# Patient Record
(demographics unavailable — no encounter records)

---

## 2017-06-02 NOTE — ER DOCUMENT REPORT
ED Extremity Problem, Lower





- General


Chief Complaint: Laceration


Stated Complaint: LEG LACERATION


Time Seen by Provider: 06/02/17 17:50


Mode of Arrival: Ambulatory


Information source: Patient


Notes: 


-year-old male presents to ED for right lower leg laceration.  He states he was 

cutting some plastic with a knife when the knife slipped and cut his lower leg.

  He states his tetanus shot was last year.


TRAVEL OUTSIDE OF THE [place] IN LAST 30 DAYS: No





- HPI


Patient complains to provider of: Injury, Pain


Location: Leg - Right lower leg


Occurred: This afternoon


Where: Home, Indoors


Onset/Duration: Sudden


Quality of pain: Sharp


Severity: Moderate


Pain Level: 4


Context: Laceration


Recent injury: Yes


Associated symptoms: Painful ambulation


Exacerbated by: Movement, Walking


Relieved by: Nothing





- Related Data


Allergies/Adverse Reactions: 


 





No Known Allergies Allergy (Verified 06/02/17 17:02)


 











Past Medical History





- General


Information source: Patient





- Social History


Smoking Status: Current Every Day Smoker


Cigarette use (# per day): Yes - 16 cigarettes a day


Chew tobacco use (# tins/day): No


Smoking Education Provided: Yes - less than 2 min


Frequency of alcohol use: Social


Drug Abuse: None


Occupation: manager of garden  at [employer]


Lives with: Family


Family History: Arthritis, CAD, COPD, CVA, Hyperlipidemia, Hypertension, 

Malignancy


Patient has suicidal ideation: No


Patient has homicidal ideation: No





- Past Medical History


Cardiac Medical History: Reports: Hx DVT, Hx Hypertension


Pulmonary Medical History: Reports: None


EENT Medical History: Reports: None


Neurological Medical History: Reports: None


Endocrine Medical History: Reports: None


Renal/ Medical History: Reports: None


Malignancy Medical History: Reports None


GI Medical History: Reports: Hx Gastroesophageal Reflux Disease, Hx Colonoscopy

, Hx Endoscopy


Musculoskeltal Medical History: Reports Hx Arthritis, Reports Hx 

Musculoskeletal Trauma - Fracture hand bilateral toes  foot nose


Skin Medical History: Reports None


Psychiatric Medical History: Reports: None


Traumatic Medical History: Reports: Hx Fractures - Fracture hand bilateral toes

  foot nose


Infectious Medical History: Reports: None


Past Surgical History: Reports: Hx Appendectomy, Hx Cardiac Catheterization, Hx 

Orthopedic Surgery - R foot/knee, L hand, Hx Tonsillectomy, Other - eye surger





- Immunizations


Immunizations up to date: No - tetnus given in ED


Hx Diphtheria, Pertussis, Tetanus Vaccination: Yes





Review of Systems





- Review of Systems


Constitutional: No symptoms reported


EENT: No symptoms reported


Cardiovascular: No symptoms reported


Respiratory: No symptoms reported


Gastrointestinal: No symptoms reported


Genitourinary: No symptoms reported


Male Genitourinary: No symptoms reported


Musculoskeletal: No symptoms reported


Skin: Other - Right lower leg laceration


Hematologic/Lymphatic: No symptoms reported


Neurological/Psychological: No symptoms reported





Physical Exam





- Vital signs


Vitals: 


 











Temp Pulse Resp BP Pulse Ox


 


 98.1 F   69   20   142/91 H  95 


 


 06/02/17 17:02  06/02/17 17:02  06/02/17 17:02  06/02/17 17:02  06/02/17 17:02











Interpretation: Normal





- General


General appearance: Appears well, Alert





- HEENT


Head: Normocephalic, Atraumatic


Eyes: Normal


Pupils: PERRL





- Respiratory


Respiratory status: No respiratory distress


Chest status: Nontender


Breath sounds: Normal


Chest palpation: Normal





- Cardiovascular


Rhythm: Regular


Heart sounds: Normal auscultation


Murmur: No





- Abdominal


Inspection: Normal


Distension: No distension


Bowel sounds: Normal


Tenderness: Nontender


Organomegaly: No organomegaly





- Back


Back: Normal, Nontender





- Extremities


General upper extremity: Normal inspection, Nontender, Normal color, Normal ROM

, Normal temperature


General lower extremity: Nontender, Normal color, Normal ROM, Normal temperature

, Normal weight bearing.  No: Shawn's sign


Calf: Laceration





- Neurological


Neuro grossly intact: Yes


Cognition: Normal


Orientation: AAOx4


Emiliano Coma Scale Eye Opening: Spontaneous


Emiliano Coma Scale Verbal: Oriented


Emiliano Coma Scale Motor: Obeys Commands


Emiliano Coma Scale Total: 15


Speech: Normal


Motor strength normal: LUE, RUE, LLE, RLE


Sensory: Normal





- Psychological


Associated symptoms: Normal affect, Normal mood





- Skin


Skin Temperature: Warm


Skin Moisture: Dry


Skin Color: Normal


Skin irregularity: Laceration - Lower leg right





Course





- Vital Signs


Vital signs: 


 











Temp Pulse Resp BP Pulse Ox


 


 98.1 F   50 L  18   121/68   95 


 


 06/02/17 17:02  06/02/17 19:27  06/02/17 19:27  06/02/17 19:27  06/02/17 19:27














Discharge





- Discharge


Clinical Impression: 


Laceration of left leg


Qualifiers:


 Encounter type: initial encounter Qualified Code(s): S81.812A - Laceration 

without foreign body, left lower leg, initial encounter





Condition: Stable


Disposition: HOME, SELF-CARE


Additional Instructions: 


LACERATION CARE:





     Your laceration has been sutured to keep the skin edges aligned during 

healing.  The time of suture removal depends on the nature and location of your 

cut.  Please follow the care instructions the doctor has outlined for you and 

return for further care, according to the schedule you've been given.


     Keep the wound and dressing clean.  Unless you were told otherwise, you 

may shower daily, blotting the wound dry with a clean, unused towel.  At other 

times, If the dressing gets wet or blood soaked, remove it and blot the wound 

dry, then reapply a new dressing.  Unless you were instructed otherwise, 

dressings should be changed at least daily.


     If any signs of infection occur (swelling, redness, drainage, increasing 

tenderness, red streaks, tender lumps in the armpit or groin above the 

laceration, or fever), see the doctor immediately.








SOAP CLEANSING:


     Gently wash the wound daily using a mild soap (like Ivory, Phisoderm, 

Neutrogena).  Use warm water, rubbing gently until all debris, ooze, and 

crusting have been washed from the wound.  Allow to dry briefly (about 10 

minutes) after cleaning.  Repeat this cleansing at least three times a day for 

the first two days and then once or twice a day.








ANTIBIOTIC OINTMENT PROTECTION:


     Your wounds are such that dressing them is not practical or optional.  

After cleansing, you should apply a thin coating of antibiotic ointment (

Bacitracin, not Neosporin) to the wounds at least three times daily.  This 

lessens infection risk, and may decrease the amount of scarring.  Use a q-tip 

or dull butter knife, not your finger, to apply this ointment.


     Any debris or ooze which builds up in the ointment should be gently rubbed 

off with a sterile gauze pad.  Harder crusting may need to be gently scrubbed 

off with a clean wash cloth with soap and warm water, perhaps applying a warm, 

wet wash cloth to the wound for ten minutes first.


     Development of redness, severe itching, or blistering may mean allergy to 

the ointment.  See the doctor.








PROPHYLACTIC ANTIBIOTIC:


     The antibiotics which have been prescribed are designed to decrease the 

risk of infection.  Only certain types of wounds benefit from this -- the 

typical cut, scrape, or burn DOES NOT require antibiotics.  Of course, 

infection can still occur despite the use of prophylactic antibiotics.


     Your wound will heal with less chance of an infectious complication if you 

take the medication as directed.  The most important dose is the FIRST dose, so 

don't delay filling the prescription!








ORAL NARCOTIC MEDICATION:


     You have been given a prescription for pain control.  This medication is a 

narcotic.  It's best taken with food, as nausea can result if taken on an empty 

stomach.


     Don't operate machinery or drive within six hours of taking this 

medication.  Do not combine this medicine with alcohol, or with any medication 

which can cause sedation (such as cold tablets or sleeping pills) unless you 

get permission from the physician.


     Narcotics tend to cause constipation.  If possible, drink plenty of fluids 

and eat a diet high in fiber and fruits.





Cephalosporins





     An antibiotic of the cephalosporin class has been prescribed. This type of 

antibiotic covers a wide variety of infections, including those of the skin, 

lungs, middle ear, and urinary tract.


     This antibiotic is somewhat similar to the penicillin family. In rare cases

, a person who is allergic to penicillin will also be allergic to this 

medication.  If you have had a severe allergic reaction to penicillin, and have 

not taken this antibiotic since that time, notify your doctor.


     Antibiotics which cover many germs ("broad spectrum" antibiotics) are more 

likely to cause diarrhea or "yeast" infections.  Women prone to vaginal yeast 

problems may suffer an attack after taking this antibiotic.  In infants, oral 

thrush (white spots "stuck" on the cheek) or yeast diaper rash may result.  See 

your doctor if these problems occur.


     Call the doctor at once if you develop hives, itching, shortness of breath

, or lightheadedness.





cef





FOLLOW-UP CARE:


     Please return in __3___ days for an infection check and dressing change.





    Your sutures should be removed in  __9___  days.





    To facilitate a timely removal of your sutures, you may return to the 

Emergency Department at [facility].  You do not need to call for 

an appointment, but the best time to come in for suture removal is early in the 

morning.





     If you have been referred to another physician for follow-up care, call 

that physicians office for an appointment as you were instructed.  If you 

experience a significant change in your laceration, or if you are concerned 

there may be an infection (swelling, redness, drainage, increasing tenderness, 

red streaks, tender lumps in the armpit or groin above the laceration, or fever)

, return to the Emergency Department immediately re-evaluation.








Prescriptions: 


Hydrocodone/Acetaminophen [Norco 5-325 mg Tablet] 1 tab PO Q6HP PRN #7 tablet


 PRN Reason: 


Cefdinir 300 mg PO Q12 7 Days


Forms:  Elevated Blood Pressure, Smoking Cessation Education, Return to Work


Referrals: 


FELIPE PLUNKETT DO [Primary Care Provider] - Follow up as needed

## 2017-08-09 NOTE — EKG REPORT
SEVERITY:- BORDERLINE ECG -

SINUS RHYTHM

BORDERLINE T WAVE ABNORMALITIES

:

Confirmed by: Kenna Nye 09-Aug-2017 10:58:21

## 2017-08-09 NOTE — PDOC H&P
History of Present Illness


Admission Date/PCP: 


  17 07:26





  FELIPE PLUNKETT DO





Patient complains of: Chest pain


History of Present Illness: 


PABLO ESPITIA JR is a 52 year old  male with underlying hypertension

, along with coronary artery disease, having suffered a previous MI  with 

negative heart cath at that time, according to patient.  No cardiac workup 

since then.





Patient was lying in bed around midnight on the night when he developed sudden 

sharp and quite severe primarily substernal chest pain, that radiated across 

his entire chest.  Did not go into his neck jaw or back.  Associated shortness 

of breath and mild diaphoresis, along with vomiting 1.  No abdominal pain.





Drove himself to the emergency room.  Has been chest pain-free since Nitropaste 

applied.





History remarkable for DVT of the left lower extremity last year, treated with 

Lovenox.  He also drove back and forth to [place] last month, but did state that 

he stopped multiple times along the way.





Currently resting quietly, chest pain-free.





Patient has been discussed with emergency room physician who evaluated the 

patient. .


 


 


 


Dictation via voice recognition software.











Laboratory results are listed in [employer] and are reviewed. 


 


 


X-ray summary results are listed below, with full report(s) reviewed. .


 


 


 


EKG reviewed and compared to prior tracing from 2013.


 


 


 


Social history/personal habits: .  4 children.   at 

[employer].  Just under a pack of cigarettes per day.  Occasional alcohol, but not 

very much or very often.  Denies illicit drug use.


 


 


 


No known drug allergies.


 


 


Home medications initially autopopulated into [email or web address] may not accurately 

reflect patient's true medications, dosages, and/or frequencies. Pharmacy tech 

to reconcile  medications.


 


Unfortunately, patient not certain of all medications/dosages/frequencies.  


 





REVIEW OF SYSTEMS: 


 


Constitutional: No fever or chills.


 


Eyes: Wears reading glasses.


 


ENT: No swallowing problems or complaints.  Partial hearing loss.


 


Pulmonary: See history and present illness.


 


Cardiovascular: See history and present illness.


 


Gastrointestinal: See history and present illness.  Mild occasional reflux.


 


Skin: No current complaints, including rashes.


 


Hematologic: Easy bruising.


 


Neurologic: No current complaints, including numbness or tingling.


 


Musculoskeletal: Joint pain from arthritis.


 


Psychiatric: Denies anxiety or depression. 


 


Endocrine: No current complaints, including polyuria.


 


Genitourinary: No current complaints, including dysuria.


 


 


PHYSICAL EXAMINATION:


 


6 feet tall.  108.4 kg.  BMI 32.4 kg/m.  Blood pressure 123/68.  Pulse 54 and 

regular.  95% saturation on room air.  Respirations are 16 and unlabored. 

Temperature 98.





Slightly obese otherwise well-nourished well-developed  male appearing 

approximately his stated age.  Pleasant awake alert and cooperative.  No 

obvious distress other than perhaps mildly anxious.


 


Skin is warm and dry.  No grossly obvious evidence of rash in areas of skin 

examined.  No subcutaneous nodules palpated.


 


ENT: Perhaps mildly hard of hearing to normal conversation.  Tongue midline on 

protrusion pink and slightly tacky.


 


Eyes: No scleral icterus.  Pupils equal and reactive to light at 4 mm.  Pink 

conjunctivae.


 


Neck is supple and nontender to gentle active range of motion and palpation.  

Midline trachea.  No palpable thyroid nodule mass enlargement or tenderness.


 


Lymphatic: No palpable cervical or clavicular nodes.


 


Neck and lymphatic exams limited by patient body habitus.


 


Psychiatric: Reasonable insight into acute and chronic medical issues.  

Oriented to time location and why here.


 


Lungs: Auscultation reveals clear and equal breath sounds bilaterally.  No use 

of accessory respiratory muscles.


 


Cardiovascular: Heart regular rate and rhythm, without gallop murmur or rub.  

No carotid or abdominal aortic bruits. No ankle or pedal edema.  Palpable 

dorsalis pedis pulses.


 


Abdomen:soft somewhat obese nontender with positive bowel sounds.  Unable to 

adequately evaluate abdomen for masses or organomegaly due to body habitus.





Compression of neither his upper abdomen nor sternum reproduces his previously 

noted chest discomfort.


 


Extremities: Feet are warm and dry.  No calf tenderness to compression.  No 

grossly obvious visual evidence of calf swelling.  Gentle manipulation of lower 

extremities fails to reveal any obvious evidence of injury or instability to 

knees hips or ankles.


 


Neurologic: Moves upper extremities grossly normally.  Patellar reflexes 

absent.  Absent Babinski.  Light touch is intact at feet.  Dorsiflexion and 

plantarflexion of feet 5 / 5 and symmetric.


 











Past Medical History


Cardiac Medical History: Reports: Coronary Artery Disease, DVT - 2016, 

Myocardial Infarction, Hypertension


   Denies: Atrial Fibrillation, Congestive Heart Failure, Hyperlipidema, 

Pulmonary Embolism


Pulmonary Medical History: 


   Denies: Asthma, Chronic Obstructive Pulmonary Disease (COPD), Sleep Apnea


EENT Medical History: Reports: Eyes - Glasses, Ears - Partial hearing loss


   Denies: Throat


Neurological Medical History: 


   Denies: Hemorrhagic CVA, Ischemic CVA, Seizures


Endocrine Medical History: 


   Denies: Diabetes Mellitus Type 1, Diabetes Mellitus Type 2, Hyperthyroidism, 

Hypothyroidism


Renal/ Medical History: Reports: None


GI Medical History: Reports: Gastroesophageal Reflux Disease


   Denies: Cirrhosis, Hepatitis, Peptic Ulcer Disease


Musculoskeltal Medical History: Reports: Arthritis


Skin Medical History: Reports: None


Psychiatric Medical History: Reports: Tobacco Dependency


   Denies: Alcohol Dependency, Depression, General Anxiety Disorder, Substance 

Abuse


Hematology: Reports: Other - Easy bruising


   Denies: Anemia


Infectious Medical History: 


   Denies: Hepatitis B, Hepatitis C





Past Surgical History


Past Surgical History: Reports: Appendectomy, Cardiac Catheterization, 

Orthopedic Surgery - R foot/knee, L hand, Tonsillectomy, Other - Removal of 

foreign body from my


   Denies: Pacemaker





Social History


Information Source: Patient, Emergency Med Personnel, [facility] Records


Lives with: Spouse/Significant other


Smoking Status: Current Every Day Smoker


Frequency of Alcohol Use: Occasional


Drugs: None





- Advance Directive


Resuscitation Status: Full Code


Surrogate healthcare decision maker:: 


Wife





Family History


Family History: Arthritis, CAD, COPD, CVA, Hyperlipidemia, Hypertension, 

Malignancy


Parental Family History Reviewed: Yes - Mother  of cerebral aneurysm; 

father of suicide.


Children Family History Reviewed: Yes - Healthy


Sibling(s) Family History Reviewed.: Yes - Coronary artery disease





Medication/Allergy


Home Medications: 








Aspirin [Aspirin EC] 81 mg PO DAILY 17 


Omeprazole 40 mg PO DAILY 17 








Allergies/Adverse Reactions: 


 





No Known Allergies Allergy (Verified 17 17:02)


 











Physical Exam


Vital Signs: 


 











Temp Pulse Resp BP Pulse Ox


 


 98.4 F   46 L  16   107/64   93 


 


 17 07:26  17 07:26  17 07:26  17 07:26  17 07:26














Results


Impressions: 


 





Chest X-Ray  17 02:51


IMPRESSION:  Mild interstitial markings which may indicate mild pulmonary edema 

and/or chronic interstitial lung disease.


 














Assessment & Plan





- Diagnosis


(1) Chest pain


Qualifiers: 


     Chest pain type: unspecified     Qualified Code(s): R07.9 - Chest pain, 

unspecified  


Is this a current diagnosis for this admission?: YesPlan: 


Patient will be placed in observation bed under chest pain protocol.  Patient 

understands to notify staff should chest pain recur.  Serial troponin .  Repeat 

EKG.  lipid panel.





I have strongly encouraged patient to be careful getting out of bed without 

notifying staff, to avoid a fall with injury.





Knee high SCDs for DVT prophylaxis, along with subcu Lovenox.  





Impression and plans were discussed with patient, who concurs   .





Time spent in evaluation and management of patient: 68 minutes.








(2) CAD (coronary artery disease)


Qualifiers: 


     Coronary Disease-Associated Artery/Lesion type: native artery     Native 

vs. transplanted heart: native heart     Associated angina: with unspecified 

angina        Qualified Code(s): I25.119 - Atherosclerotic heart disease of 

native coronary artery with unspecified angina pectoris  


Is this a current diagnosis for this admission?: Yes





(3) History of DVT (deep vein thrombosis)


Is this a current diagnosis for this admission?: YesPlan: 


D-dimer.








(4) HTN (hypertension)


Qualifiers: 


     Hypertension type: essential hypertension        Qualified Code(s): I10 - 

Essential (primary) hypertension  


Is this a current diagnosis for this admission?: YesPlan: 


Resume home medications as appropriate once these have been determined and 

reviewed.








(5) Tobacco dependency


Is this a current diagnosis for this admission?: YesPlan: 


As needed nicotine patch.

## 2017-08-09 NOTE — RADIOLOGY REPORT (SQ)
EXAM DESCRIPTION:  CTA CHEST



COMPLETED DATE/TIME:  8/9/2017 5:43 pm



REASON FOR STUDY:  chest pain, eval for dissection, aneurysm



COMPARISON:  None.



TECHNIQUE:  CT scan of the chest performed using helical scanning technique with dynamic intravenous 
contrast injection.  Images reviewed with lung, soft tissue and bone windows.  Reconstructed coronal 
and sagittal MPR images reviewed.

Additional 3 dimensional post-processing performed to develop Maximal Intensity Projection images (MI
P).  All images stored on PACS.

All CT scanners at this facility use dose modulation, iterative reconstruction, and/or weight based d
osing when appropriate to reduce radiation dose to as low as reasonably achievable (ALARA).

[facility]: Dose Right  [facility]: CareDose    [facility]: Dose Right    [facility]: Teradose 4D    [facility]: Smart Technologies



CONTRAST TYPE AND DOSE:  contrast/concentration: Isovue 370.00 mg/ml; Total Contrast Delivered: 82.0 
ml; Total Saline Delivered: 70.0 ml



RENAL FUNCTION:  Creatinine 0.7 BUN 15



RADIATION DOSE:  Up-to-date CT equipment and radiation dose reduction techniques were employed. CTDIv
ol: 24.0 - 29.8 mGy. DLP: 968 mGy-cm. .



LIMITATIONS:  None.



FINDINGS:  LUNGS AND PLEURA: Small peripheral blebs are present in the lung apices.  Small blebs are 
seen posteriorly in the lower lobes.  There is no pulmonary infiltrate or pleural effusion.

AORTA AND GREAT VESSELS: No aneurysm or dissection.

HEART: No pericardial effusion.

PULMONARY ARTERIES: No emboli visualized in the main pulmonary arteries or the segmental branches.

HILAR AND MEDIASTINAL STRUCTURES: No identified masses or abnormal nodes.

HARDWARE: None in the chest.

UPPER ABDOMEN: No significant findings.  Limited exam.

THYROID AND OTHER SOFT TISSUES: No masses.  No adenopathy.

BONES: No acute or significant finding.

3D MIPS: Confirm above findings.

OTHER: No other significant finding.



IMPRESSION:  1.  Pulmonary emphysematous changes as described.

2.  There is no evidence of aortic aneurysm or dissection.  There is no evidence of pulmonary embolus
.



TECHNICAL DOCUMENTATION:  JOB ID:  [id number]

Quality ID # [id number]: Final reports with documentation of one or more dose reduction techniques (e.g., Au
tomated exposure control, adjustment of the mA and/or kV according to patient size, use of iterative 
reconstruction technique)

 2011 [employer]- All Rights Reserved

## 2017-08-09 NOTE — ER DOCUMENT REPORT
ED General





- General


Chief Complaint: Chest Pain


Stated Complaint: CHEST PAIN


Time Seen by Provider: 08/09/17 04:15


Notes: 


Patient is a 52-year-old male who presents with complaints of chest pain.  

Chest pain came on suddenly while he is lying in bed at home around midnight.  

Pain was sharp and severe.  It radiated across his entire chest.  It did not go 

into his neck or jaw or back.  He did feel short of breath.  He was a little 

diaphoretic.  He did vomit once.  No abdominal pain.  He does have a history of 

"a mild MI" in 2007.  He was shipped [facility].  At that time they 

did a heart cath and stress test which was negative.  No stress test since 

then.  Does have a history of high blood pressure.  He does have a history of 

smoking.


TRAVEL OUTSIDE OF THE [place] IN LAST 30 DAYS: No





- Related Data


Allergies/Adverse Reactions: 


 





No Known Allergies Allergy (Verified 06/02/17 17:02)


 











Past Medical History





- Social History


Smoking Status: Current Every Day Smoker


Frequency of alcohol use: None


Drug Abuse: None


Family History: Arthritis, CAD, COPD, CVA, Hyperlipidemia, Hypertension, 

Malignancy


Patient has suicidal ideation: No


Patient has homicidal ideation: No





- Past Medical History


Cardiac Medical History: Reports: Hx DVT, Hx Hypertension


Pulmonary Medical History: 


   Denies: Hx COPD


Renal/ Medical History: Denies: Hx Peritoneal Dialysis


GI Medical History: Reports: Hx Gastroesophageal Reflux Disease, Hx Colonoscopy

, Hx Endoscopy


Musculoskeltal Medical History: Reports Hx Arthritis, Reports Hx 

Musculoskeletal Trauma - Fracture hand bilateral toes  foot nose


Traumatic Medical History: Reports: Hx Fractures - Fracture hand bilateral toes

  foot nose


Past Surgical History: Reports: Hx Appendectomy, Hx Cardiac Catheterization, Hx 

Orthopedic Surgery - R foot/knee, L hand, Hx Tonsillectomy, Other - eye surger.

  Denies: Hx Pacemaker





- Immunizations


Immunizations up to date: No - tetnus given in ED


Hx Diphtheria, Pertussis, Tetanus Vaccination: Yes





Review of Systems





- Review of Systems


Notes: 


My Normal Review Basic





REVIEW OF SYSTEMS:


CONSTITUTIONAL :  Denies fever,  chills, or sweats.  Denies recent illness.


CARDIOVASCULAR: Had chest pain


RESPIRATORY:  Denies cough, cold, or chest congestion.  Denies shortness of 

breath, difficulty breathing, or wheezing.


GASTROINTESTINAL:  Denies abdominal pain. Vomiting x1.  Denies constipation.  

Last BM: 


MUSCULOSKELETAL:  Denies neck or back pain or joint pain or swelling.


SKIN:   Denies rash or skin lesions.


NEUROLOGICAL:  Denies altered mental status or loss of consciousness.  Denies 

headache.  Denies weakness or paralysis or loss of use of either side.  Denies 

problems with gait or speech.  Denies sensory or motor loss.


ALL OTHER SYSTEMS REVIEWED AND NEGATIVE.





Physical Exam





- Vital signs


Vitals: 


 











Temp Pulse Resp BP Pulse Ox


 


 98 F   61   18   144/87 H  96 


 


 08/09/17 01:42  08/09/17 01:42  08/09/17 01:42  08/09/17 01:42  08/09/17 01:42














- Notes


Notes: 


General Appearance: Well nourished, alert, cooperative, no acute distress, no 

obvious discomfort.


Vitals: reviewed, See vital signs table.


Eyes: PERRL, EOMI, Conjuctiva clear


Mouth: No decreasd moisture


Neck: Supple, no neck tenderness


Lungs: No wheezing, No rales, No rhonci, No accessory muscle use, good air 

exchange bilaterally.


Heart: Normal rate, Regular rythm, No murmur, no rub


Abdomen: Normal BS, soft, No rigidity, No abdominal tenderness, No guarding, no 

rebound, no abdominal masses, no organomegaly


Extremities: strength 5/5 in all extremities, good pulses in all extremities, 

no swelling or tenderness in the extremities, no edema.


Skin: warm, dry, appropriate color, no rash


Neuro: speech clear, oriented x 3, normal affect, responds appropriately to 

questions.





Course





- Re-evaluation


Re-evalutation: 


08/09/17 04:30


Patient is feeling improved.  His pain is improving.  I will give him a dose of 

aspirin.  Last time it aspirin was 5 AM yesterday.  Patient does have risk 

factors for heart disease.  He is a smoker.  Some family history.  Patient's 

heart score is 4.  He does have Nitropaste in place to help prevent return of 

pain.  I did talk to him about admission and he is agreeable to it.  We are 

currently pending to hear back from the hospitalist about possible admission.





08/09/17 06:14


I spoke with Dr. Mitchell who agrees to admit the patient.





- Vital Signs


Vital signs: 


 











Temp Pulse Resp BP Pulse Ox


 


 98 F   61   18   144/87 H  96 


 


 08/09/17 01:42  08/09/17 01:42  08/09/17 01:42  08/09/17 01:42  08/09/17 01:42














- Laboratory


Result Diagrams: 


 08/09/17 03:10





 08/09/17 03:10


Laboratory results interpreted by me: 


 











  08/09/17 08/09/17 08/09/17





  03:10 03:10 03:10


 


RDW  14.7 H  


 


Chloride   109 H 


 


Glucose   139 H 


 


Calcium   8.3 L 


 


Creatine Kinase   369 H 


 


CK-MB (CK-2)    4.79 H














Discharge





- Discharge


Clinical Impression: 


Chest pain


Qualifiers:


 Chest pain type: unspecified Qualified Code(s): R07.9 - Chest pain, unspecified





Condition: Stable


Disposition: ADMITTED AS OBSERVATION


Admitting Provider: Hospitalist


Unit Admitted: Telemetry

## 2017-08-09 NOTE — RADIOLOGY REPORT (SQ)
EXAM DESCRIPTION:  CHEST SINGLE VIEW



COMPLETED DATE/TIME:  8/9/2017 3:37 am



REASON FOR STUDY:  chest pain



COMPARISON:  None.



EXAM PARAMETERS:  NUMBER OF VIEWS: One view.

TECHNIQUE: Single frontal radiographic view of the chest acquired.

RADIATION DOSE: NA

LIMITATIONS: None.



FINDINGS:  LUNGS AND PLEURA: Mild interstitial markings.

MEDIASTINUM AND HILAR STRUCTURES: No masses.  Contour normal.

HEART AND VASCULAR STRUCTURES: Heart normal in size.  Normal vasculature.

BONES: No acute findings.

HARDWARE: None in the chest.

OTHER: No other significant finding.



IMPRESSION:  Mild interstitial markings which may indicate mild pulmonary edema and/or chronic inters
titial lung disease.



TECHNICAL DOCUMENTATION:  JOB ID:  [id number]

## 2017-08-09 NOTE — PDOC TRANSFER SUMMARY
General


Admission Date/PCP: 


  08/09/17 07:26





  FELIPE PLUNKETT DO





Transfer Date: 08/10/17


Accepting Facility: [facility]


Accepting Physician: dr patrick rodriguez


Resuscitation Status: Full Code





- Transfer Diagnosis


(1) NSTEMI (non-ST elevation myocardial infarction)


Is this a current diagnosis for this admission?: YesDiagnosis Summary: 





presented with chest pain and a good story and ruled in for acute myocardial 

ischemia with rising troponins and CK/MBs.  his ecg shows loss of R wave 

progression and flattening of anterolateral T waves since last one on file in 

2013.  case was discussed with dr roland without formal consultation and he 

agrees that pt needs interventional cardiology evaluation for left heart cath.  

I spoke with Dr Patrick Rodriguez at [facility] who agreed to accept 

him transfer pending an available bed.  He remains chest pain free at this 

time.  He is now receiving topical NTG, loading dose of plavix 300mg x1, 

lovenox 1 mg/kg, ASA and high dose lipitor.  beta blocker is contraindicated 

due to developing mild bradycardia with resting HR in mid-50s.  Case discussed 

with he and his wife and they are agreeable to transfer.








(2) CAD (coronary artery disease)


Is this a current diagnosis for this admission?: YesDiagnosis Summary: 


as above








(3) History of DVT (deep vein thrombosis)


Is this a current diagnosis for this admission?: YesDiagnosis Summary: 


treated appropriately last year, not on chronic anticoagulation; CTA neg for 

dissection, aneurysm and PE and ddimer only 28.








(4) HTN (hypertension)


Is this a current diagnosis for this admission?: YesDiagnosis Summary: 


well controlled at present.








(5) Tobacco dependency


Is this a current diagnosis for this admission?: YesDiagnosis Summary: 


counseled regarding tobacco cessation











- Transfer Medications


Home Medications: 


 





Aspirin [Aspirin EC] 81 mg PO DAILY 08/09/17 


Omeprazole 40 mg PO DAILY 08/09/17 








Transfer Medications: 


 Current Medications





Acetaminophen (Tylenol 325 Mg Tablet)  650 mg PO Q4HP PRN


   Stop: 09/08/17 07:28


Al Hydrox/Mg Hydrox/Simethicone (Maalox Plus Susp 30 Udcup)  30 ml PO Q4HP PRN


   PRN Reason: indigestion


   Stop: 09/08/17 07:25


Aspirin (Ecotrin 81 Mg Ec Tablet)  81 mg PO DAILY PEDRO


   Stop: 09/08/17 09:59


   Last Admin: 08/09/17 09:43 Dose:  81 mg


Atorvastatin Calcium (Lipitor 80 Mg Tablet)  80 mg PO QHS PEDRO


   Stop: 09/08/17 21:59


Docusate Sodium (Colace 100 Mg Capsule)  100 mg PO BID PEDRO


   Stop: 09/08/17 09:59


   Last Admin: 08/09/17 17:51 Dose:  Not Given


Enoxaparin Sodium (Lovenox Inj 120 Mg/0.8 Ml Disp.Syrin)  110 mg SUBCUT Q12 PEDRO


   Stop: 09/08/17 21:59


Sodium Chloride (Nacl 0.9% 1000 Ml Iv Soln)  1,000 mls @ 100 mls/hr IV 

CONTINUOUS PRN


   PRN Reason: THIS MED IS NOT "PRN"


   Stop: 09/08/17 13:57


   Last Admin: 08/09/17 15:19 Dose:  1,000 ml


Nicotine (Nicoderm 14 Mg/24 Hr Transdermal Patch)  1 each TD DAILYP PRN


   Stop: 09/08/17 07:35


   Last Admin: 08/09/17 09:44 Dose:  1 each


Nitroglycerin (Nitro-Dur 2.5 Mg (0.1 Mg/Hr) Transdermal Ptch)  1 each TD DAILY 

PEDRO


   Stop: 09/09/17 09:59


Promethazine HCl (Phenergan 25 Mg Tablet)  12.5 mg PO Q6HP PRN


   Stop: 09/08/17 07:28


Sodium Chloride (Saline Flush 2.5 Ml Monoject Prefil Syrin)  2.5 ml IV Q8 PEDRO


   Stop: 09/08/17 13:59


   Last Admin: 08/09/17 15:33 Dose:  Not Given











- Allergies


Allergies/Adverse Reactions: 


 





No Known Allergies Allergy (Verified 06/02/17 17:02)


 











- Diet/Activity


Discharge Diet: Regular


Discharge Activity: Supervised Activity





Hospital Course


Hospital Course: 


per dr rachel's admitting H&P - "PABLO ESPITIA JR is a 52 year old 

 male with underlying hypertension, along with coronary artery disease

, having suffered a previous MI 2007 with negative heart cath at that time, 

according to patient.  No cardiac workup since then.  Patient was lying in bed 

around midnight on the night when he developed sudden sharp and quite severe 

primarily substernal chest pain, that radiated across his entire chest.  Did 

not go into his neck jaw or back.  Associated shortness of breath and mild 

diaphoresis, along with vomiting 1.  No abdominal pain.  Drove himself to the 

emergency room.  Has been chest pain-free since Nitropaste applied.  History 

remarkable for DVT of the left lower extremity last year, treated with Lovenox.

  He also drove back and forth to [place] last month, but did state that he 

stopped multiple times along the way.  Currently resting quietly, chest pain-

free."





his initial evaluation was largely reassuring other than elevated CK/MB, his 

TpI was normal and ecg just had nonspecific findings.  his chest went away with 

topical NTG and did not return.  he was admitted and ruled in for acute 

myocardial ischemia with rising enzymes but no further changes to his ecg than 

noted above.  stat CTA ruled out aortic dissection and aneurysm and PE, ddimer 

was only 28 and wnl and remainder of his labs unremarkable, his lipid panel 

only mildly abnormal.





case discussed wt dr rodriguez, accepting cardiologist at [facility] and arrangements 

made for transfer to their facility when bed available; in the meantime he is 

treated medically.








Physical Exam


Vital Signs: 


 











Temp Pulse Resp BP Pulse Ox


 


 98.3 F   55 L  16   107/64   95 


 


 08/09/17 16:59  08/09/17 16:59  08/09/17 16:59  08/09/17 16:59  08/09/17 16:59








 Intake & Output











 08/08/17 08/09/17 08/10/17





 06:59 06:59 06:59


 


Intake Total   2052


 


Output Total   1700


 


Balance   352


 


Weight   108 kg











General appearance: PRESENT: no acute distress, obese, well-developed, well-

nourished


Head exam: PRESENT: atraumatic, normocephalic


Eye exam: PRESENT: EOMI.  ABSENT: conjunctival injection, scleral icterus


Neck exam: ABSENT: carotid bruit, JVD, lymphadenopathy, tenderness


Respiratory exam: PRESENT: clear to auscultation lamont.  ABSENT: accessory muscle 

use


Cardiovascular exam: PRESENT: RRR.  ABSENT: diastolic murmur, systolic murmur


Pulses: PRESENT: normal radial pulses, normal dorsalis pedis pul


Vascular exam: PRESENT: normal capillary refill


GI/Abdominal exam: PRESENT: normal bowel sounds, soft.  ABSENT: tenderness


Extremities exam: PRESENT: clubbing.  ABSENT: calf tenderness, pedal edema


Musculoskeletal exam: PRESENT: ambulatory, full ROM


Neurological exam: PRESENT: alert, awake, oriented to person, oriented to place

, oriented to time, oriented to situation


Psychiatric exam: PRESENT: appropriate affect, normal mood


Skin exam: PRESENT: dry, warm





Results


Laboratory Results: 


 











  08/09/17





  09:05


 


Triglycerides  167 H


 


Cholesterol  152.32


 


LDL Cholesterol Direct  86


 


VLDL Cholesterol  33.4 H


 


HDL Cholesterol  40








 











  08/09/17 08/09/17 08/09/17





  09:05 10:44 15:30


 


Creatine Kinase   


 


CK-MB (CK-2)    5.94 H


 


Troponin I  Cancelled  0.373  0.509


 


NT-Pro-B Natriuret Pep    46














  08/09/17





  15:30


 


Creatine Kinase  311 H


 


CK-MB (CK-2) 


 


Troponin I 


 


NT-Pro-B Natriuret Pep 











Impressions: 


 





Chest/Abdomen CTA  08/09/17 00:00


IMPRESSION:  1.  Pulmonary emphysematous changes as described.


2.  There is no evidence of aortic aneurysm or dissection.  There is no 

evidence of pulmonary embolus.


 








Chest X-Ray  08/09/17 02:51


IMPRESSION:  Mild interstitial markings which may indicate mild pulmonary edema 

and/or chronic interstitial lung disease.


 














Plan


Discharge Plan: 


transfer for invasive cardio eval


Time Spent: Greater than 30 Minutes

## 2017-08-09 NOTE — PROGRESS NOTE
Provider Note


Provider Note: 


admitted earlier this morning for substernal chest pain with history of prior 

MI but negative heart cath at [facility], his pain resolved with topical nitro 

and has not recurred.





his exam is benign, no murmur, lungs are clear, NSR without ischemic changes on 

ecg, symmetric pulses at the radial arteries, no edema or JVD, no carotid or 

abdominal bruits, clubbing of all nails with tobacco stains on his dominant 

hand.  cxr was clear without widening of the mediastinum.  labs unremarkable 

except CK, MB elevated on arrival and repeat troponin now 0.373.





A/P:





NSTEMI - will ck another troponin, echo and CTA chest.  start empiric full dose 

lovenox, high dose statin, topical nitro and continue ASA.  if continues to 

rise and remaining eval are negative then he will need transfer for heart cath





tob abuse - cessation counseling

## 2017-08-10 NOTE — PROGRESS NOTE
Provider Note


Provider Note: 


patient seen and examined; chart reviewed; nursing notes reviewed and discussed 

with nurse at bedside





he remains chest pain free and in good spirits.  he is hemodynamically stable 

as well.  awake and alert and oriented x3, lungs CTAB, cardio rrr without m/r/g

, radial pulses good and symmetric, no edema or JVD, abd s/nt/nd with good BSs 

and no abd bruit, strenght is 5/5 upper and lower ext's, mood/affect 

appropriate.





CTA chest reviewed and shows no worrisome findings.





at this point he is stable for transfer to [facility] for invasive cardio 

evaluation pending bed availability.





a/p:


NSTEMI - stable, continue current regimen


HTN - well controlled on current regimen


tob abuse - cessation counseling offered previously, wife assures me "he WILL 

quit"

## 2017-08-10 NOTE — EKG REPORT
SEVERITY:- ABNORMAL ECG -

SINUS RHYTHM

NONSPECIFIC T ABNORMALITIES, LATERAL LEADS

:

Confirmed by: Kenna Nye 10-Aug-2017 13:28:18

## 2018-03-20 NOTE — EKG REPORT
SEVERITY:- OTHERWISE NORMAL ECG -

SINUS BRADYCARDIA

:

Confirmed by: Kenna Nye 20-Mar-2018 15:52:35

## 2018-03-20 NOTE — PDOC H&P
History of Present Illness


Admission Date/PCP: 


  03/20/18 17:07





  FELIPE JAMES MD





Patient complains of: Chest pain and left foot pain


History of Present Illness: 


PABLO ESPITIA JR is a 52 year old male resented to hospital with complaint of 

chest pain.  Patient states the chest pain began today.  Patient states he was 

short of breath and nauseated.  Patient states that the real reason why he came 

to the emergency room was because his left foot was hurting.  Patient reports 

that he has been evaluated for coronary artery disease invited where he 

underwent a cardiac cath that demonstrated essentially normal vessels.  ER has 

also documented that they called by the cardiology and cardiology stated that 

patient's chest pain is not cardiac.








Past Medical History


Cardiac Medical History: Reports: Coronary Artery Disease, DVT - 2016, 

Myocardial Infarction, Hypertension


   Denies: Atrial Fibrillation, Congestive Heart Failure, Hyperlipidema, 

Pulmonary Embolism


Pulmonary Medical History: 


   Denies: Asthma, Chronic Obstructive Pulmonary Disease (COPD), Sleep Apnea


Neurological Medical History: 


   Denies: Seizures


Endocrine Medical History: 


   Denies: Diabetes Mellitus Type 1, Diabetes Mellitus Type 2, Hyperthyroidism, 

Hypothyroidism


GI Medical History: Reports: Gastroesophageal Reflux Disease


   Denies: Cirrhosis, Hepatitis


Musculoskeltal Medical History: Reports: Arthritis


Psychiatric Medical History: 


   Denies: Depression


Hematology: 


   Denies: Anemia





Past Surgical History


Past Surgical History: Reports: Appendectomy, Cardiac Catheterization, 

Orthopedic Surgery - R foot/knee, L hand, R arm, Tonsillectomy, Other - Removal 

of foreign body from my


   Denies: Pacemaker





Social History


Smoking Status: Current Every Day Smoker


Frequency of Alcohol Use: Occasional


Hx Recreational Drug Use: No


Drugs: None


Hx Prescription Drug Abuse: No





- Advance Directive


Resuscitation Status: Full Code





Family History


Family History: Arthritis, CAD, COPD, CVA, Hyperlipidemia, Hypertension, 

Malignancy


Parental Family History Reviewed: Yes


Children Family History Reviewed: Yes


Sibling(s) Family History Reviewed.: Yes





Medication/Allergy


Home Medications: 








Aspirin [Aspirin EC] 81 mg PO DAILY 08/09/17 


Omeprazole 40 mg PO DAILY 08/09/17 








Allergies/Adverse Reactions: 


 





No Known Allergies Allergy (Verified 03/20/18 14:33)


 











Review of Systems


Constitutional: ABSENT: chills, fever(s), headache(s), weight gain, weight loss


Eyes: ABSENT: visual disturbances


Ears: ABSENT: hearing changes


Cardiovascular: PRESENT: chest pain.  ABSENT: dyspnea on exertion, edema, 

orthropnea, palpitations


Respiratory: ABSENT: cough, hemoptysis


Gastrointestinal: ABSENT: abdominal pain, constipation, diarrhea, hematemesis, 

hematochezia, nausea, vomiting


Genitourinary: ABSENT: dysuria, hematuria


Musculoskeletal: PRESENT: joint swelling - Left foot tenderness


Integumentary: ABSENT: rash, wounds


Neurological: ABSENT: abnormal gait, abnormal speech, confusion, dizziness, 

focal weakness, syncope


Psychiatric: ABSENT: anxiety, depression, homidical ideation, suicidal ideation


Endocrine: ABSENT: cold intolerance, heat intolerance, polydipsia, polyuria


Hematologic/Lymphatic: ABSENT: easy bleeding, easy bruising





Physical Exam


Vital Signs: 


 











Temp Pulse Resp BP Pulse Ox


 


 98.6 F   58 L  16   133/81 H  97 


 


 03/20/18 14:37  03/20/18 14:37  03/20/18 14:37  03/20/18 14:37  03/20/18 16:06











General appearance: PRESENT: no acute distress, well-developed, well-nourished


Head exam: PRESENT: atraumatic, normocephalic


Eye exam: PRESENT: conjunctiva pink, EOMI.  ABSENT: scleral icterus


Ear exam: PRESENT: normal external ear exam


Mouth exam: PRESENT: moist, tongue midline


Neck exam: ABSENT: carotid bruit, JVD, lymphadenopathy, thyromegaly


Respiratory exam: PRESENT: clear to auscultation lamont.  ABSENT: rales, rhonchi, 

wheezes


Cardiovascular exam: PRESENT: RRR.  ABSENT: diastolic murmur, rubs, systolic 

murmur


Pulses: PRESENT: normal dorsalis pedis pul


Vascular exam: PRESENT: normal capillary refill


GI/Abdominal exam: PRESENT: normal bowel sounds, soft.  ABSENT: distended, 

guarding, mass, organolmegaly, rebound, tenderness


Rectal exam: PRESENT: deferred


Extremities exam: ABSENT: calf tenderness, clubbing, pedal edema


Neurological exam: PRESENT: alert, awake, oriented to person, oriented to place

, oriented to time, oriented to situation, CN II-XII grossly intact.  ABSENT: 

motor sensory deficit


Psychiatric exam: PRESENT: appropriate affect, normal mood.  ABSENT: homicidal 

ideation, suicidal ideation


Skin exam: PRESENT: dry, intact, warm.  ABSENT: cyanosis, rash





Results


Impressions: 


 





Venous Doppler Study  03/20/18 15:00


IMPRESSION:  NO EVIDENCE OF DVT OR SVT IN THE LEFT LEG.


 














Assessment & Plan





- Diagnosis


(1) Chest pain


Is this a current diagnosis for this admission?: Yes   


Plan: 


Noncardiac: Patient had a cardiac cath August 2017 invited that demonstrated no 

significant disease.  Will try to obtain records.  Will have cardiology 

evaluate patient.








(2) Tendinitis of ankle


Is this a current diagnosis for this admission?: Yes   


Plan: 


Supportive care will write for Lidoderm patch. X ray Left foot. 








(3) NSTEMI (non-ST elevation myocardial infarction)


Is this a current diagnosis for this admission?: Yes   


Plan: 


History of NSTEMI: Supportive care. 








(4) History of DVT (deep vein thrombosis)


Is this a current diagnosis for this admission?: Yes   


Plan: 


SCDs








(5) Tobacco dependency


Is this a current diagnosis for this admission?: Yes   


Plan: 


Nicotine








- Time


Time Spent: 30 to 50 Minutes

## 2018-03-20 NOTE — ER DOCUMENT REPORT
ED General





- General


Chief Complaint: Nausea/Vomiting


Stated Complaint: NAUSEA/ CHEST PAIN


Time Seen by Provider: 03/20/18 14:49


Mode of Arrival: Ambulatory


Information source: Patient


Notes: 





52-year-old male presents with complaints of chest pain, patient denies any 

fevers or chills admits to nausea vomiting diarrhea over the past 3 days noted 

to have epigastric burning sensation


TRAVEL OUTSIDE OF THE [place] IN LAST 30 DAYS: No





- HPI


Onset: Just prior to arrival


Onset/Duration: Sudden


Quality of pain: Pressure


Severity: Mild


Pain Level: 1


Associated symptoms: Chest pain


Exacerbated by: Denies


Relieved by: Denies


Similar symptoms previously: No


Recently seen / treated by doctor: No





- Related Data


Allergies/Adverse Reactions: 


 





No Known Allergies Allergy (Verified 03/20/18 14:33)


 











Past Medical History





- Social History


Smoking Status: Current Every Day Smoker


Cigarette use (# per day): Yes


Chew tobacco use (# tins/day): No


Smoking Education Provided: No


Frequency of alcohol use: Occasional


Drug Abuse: None


Family History: Arthritis, CAD, COPD, CVA, Hyperlipidemia, Hypertension, 

Malignancy


Patient has suicidal ideation: No


Patient has homicidal ideation: No





- Past Medical History


Cardiac Medical History: Reports: Hx Coronary Artery Disease, Hx DVT - 2016, Hx 

Heart Attack, Hx Hypertension


   Denies: Hx Atrial Fibrillation, Hx Congestive Heart Failure, Hx 

Hypercholesterolemia, Hx Pulmonary Embolism


Pulmonary Medical History: 


   Denies: Hx Asthma, Hx COPD, Hx Sleep Apnea


Neurological Medical History: Denies: Hx Seizures


Endocrine Medical History: Denies: Hx Diabetes Mellitus Type 1, Hx Diabetes 

Mellitus Type 2, Hx Hyperthyroidism, Hx Hypothyroidism


Renal/ Medical History: Denies: Hx Peritoneal Dialysis


GI Medical History: Reports: Hx Gastroesophageal Reflux Disease, Hx Colonoscopy

, Hx Endoscopy.  Denies: Hx Cirrhosis, Hx Hepatitis


Musculoskeltal Medical History: Reports Hx Arthritis, Reports Hx 

Musculoskeletal Trauma - Fracture hand bilateral toes  foot nose


Psychiatric Medical History: 


   Denies: Hx Depression


Traumatic Medical History: Reports: Hx Fractures - Fracture hand bilateral toes

  foot nose


Infectious Medical History: Denies: Hx Hepatitis


Past Surgical History: Reports: Hx Appendectomy, Hx Cardiac Catheterization, Hx 

Orthopedic Surgery - R foot/knee, L hand, R arm, Hx Tonsillectomy, Other - 

Removal of foreign body from my.  Denies: Hx Pacemaker





- Immunizations


Immunizations up to date: No - tetnus given in ED


Hx Diphtheria, Pertussis, Tetanus Vaccination: Yes





Review of Systems





- Review of Systems


Notes: 





REVIEW OF SYSTEMS:


CONSTITUTIONAL :  Denies fever,  chills, or sweats.  Denies recent illness.


EENT:   Denies eye, ear, throat, or mouth pain or symptoms.  Denies nasal or 

sinus congestion or discharge.  Denies throat, tongue, or mouth swelling or 

difficulty swallowing.


CARDIOVASCULAR: Admits to chest


RESPIRATORY:  Denies cough, cold, or chest congestion.  Denies shortness of 

breath, difficulty breathing, or wheezing.


GASTROINTESTINAL:  Denies abdominal pain or distention.  Denies nausea, vomiting

, or diarrhea.  Denies blood in vomitus, stools, or per rectum.  Denies black, 

tarry stools.  Denies constipation.  


GENITOURINARY:  Denies difficulty urinating, painful urination, burning, 

frequency, blood in urine, or discharge.


MUSCULOSKELETAL:  Denies back or neck pain or stiffness.  Denies joint pain or 

swelling.


SKIN:   Left foot swelling


HEMATOLOGIC :   Denies easy bruising or bleeding.


LYMPHATIC:  Denies swollen, enlarged glands.


NEUROLOGICAL:  Denies confusion or altered mental status.  Denies passing out 

or loss of consciousness.  Denies dizziness or lightheadedness.  Denies 

headache.  Denies weakness or paralysis or loss of use of either side.  Denies 

problems with gait or speech.  Denies sensory loss, numbness, or tingling.  

Denies seizures.


PSYCHIATRIC:  Denies anxiety or stress.  Denies depression, suicidal ideation, 

or homicidal ideation.





ALL OTHER SYSTEMS REVIEWED AND NEGATIVE.





Dictation was performed using Dragon voice recognition software 





PHYSICAL EXAMINATION:





GENERAL: Well-appearing, well-nourished and in no acute distress.





HEAD: Atraumatic, normocephalic.





EYES: Pupils equal round and reactive to light, extraocular movements intact, 

sclera anicteric, conjunctiva are normal.





ENT: Nares patent, oropharynx clear without exudates.  Moist mucous membranes.





NECK: Normal range of motion, supple without lymphadenopathy





LUNGS: Breath sounds clear to auscultation bilaterally and equal.  No wheezes 

rales or rhonchi.





HEART: Regular rate and rhythm without murmurs





ABDOMEN: Soft, nontender, nondistended abdomen.  No guarding, no rebound.  No 

masses appreciated.





Musculoskeletal: Normal range of motion, no pitting or edema.  No cyanosis.





NEUROLOGICAL: Cranial nerves grossly intact.  Normal speech, normal gait.  

Normal sensory, motor exams 





PSYCH: Normal mood, normal affect.





SKIN: Warm, Dry, normal turgor, no rashes or lesions noted.





Physical Exam





- Vital signs


Vitals: 


 











Temp Pulse Resp BP Pulse Ox


 


 98.6 F   58 L  16   133/81 H  96 


 


 03/20/18 14:37  03/20/18 14:37  03/20/18 14:37  03/20/18 14:37  03/20/18 14:37














Course





- Re-evaluation


Re-evalutation: 





03/20/18 16:29


[facility] paged no answer 


03/20/18 16:45


[facility] called , pt did have NSTEMI but there were no blockages and 

medical managment was all that was needed


03/20/18 16:47








- Vital Signs


Vital signs: 


 











Temp Pulse Resp BP Pulse Ox


 


 98.6 F   58 L  16   133/81 H  97 


 


 03/20/18 14:37  03/20/18 14:37  03/20/18 14:37  03/20/18 14:37  03/20/18 16:06














- Laboratory


Result Diagrams: 


 03/20/18 15:06





 03/20/18 15:06


Laboratory results interpreted by me: 


 











  03/20/18





  15:06


 


RDW  14.5 H














Discharge





- Discharge


Clinical Impression: 


Chest pain


Qualifiers:


 Chest pain type: unspecified Qualified Code(s): R07.9 - Chest pain, unspecified





CAD (coronary artery disease)


Qualifiers:


 Coronary Disease-Associated Artery/Lesion type: unspecified vessel or lesion 

type Native vs. transplanted heart: native heart Associated angina: without 

angina Qualified Code(s): I25.10 - Atherosclerotic heart disease of native 

coronary artery without angina pectoris





Condition: Stable


Disposition: ADMITTED AS OBSERVATION


Admitting Provider: Hospitalist


Unit Admitted: Telemetry

## 2018-03-20 NOTE — RADIOLOGY REPORT (SQ)
EXAM DESCRIPTION:  FOOT LEFT 2 VIEWS



COMPLETED DATE/TIME:  3/20/2018 6:26 pm



REASON FOR STUDY:  left foot pain



COMPARISON:  None.



NUMBER OF VIEWS:  Two views



TECHNIQUE:  AP and lateral radiographic images acquired of the left foot.



LIMITATIONS:  None.



FINDINGS:  MINERALIZATION: Normal.

BONES: No acute fracture or dislocation.  No worrisome bone lesions.  Separate bony ossicles are iden
tified at the level of the navicular and talar tarsal bones.

JOINTS: No effusions.

SOFT TISSUES: No soft tissue swelling.  No foreign body.

OTHER: Minimal Achilles tendon and plantar spurring is identified.



IMPRESSION:  NEGATIVE STUDY OF THE LEFT FOOT. NO RADIOGRAPHIC EVIDENCE OF ACUTE INJURY.



TECHNICAL DOCUMENTATION:  JOB ID:  [id number]

 2011 [employer]- All Rights Reserved



Reading location - IP/workstation name: [email or web address]

## 2018-03-20 NOTE — RADIOLOGY REPORT (SQ)
EXAM DESCRIPTION:  VENOUS UNILATERAL LOWER



COMPLETED DATE/TIME:  3/20/2018 4:03 pm



REASON FOR STUDY:  left lower swelling



COMPARISON:  9/1/2016



TECHNIQUE:  Dynamic and static gray scale and color images acquired of the left leg venous system. Se
lected spectral images acquired with additional compression and augmentation maneuvers. The contralat
eral common femoral vein and saphenofemoral junction were also imaged. Images stored on PACS.



LIMITATIONS:  None.



FINDINGS:  COMMON FEMORAL: Normal phasicity, compression and augmentation. No visualized echogenic ma
terial on gray scale. No defects on color images.

FEMORAL: Normal compression and augmentation. No visualized echogenic material on gray scale. No defe
cts on color images.

POPLITEAL: Normal compression, augmentation. No visualized echogenic material on gray scale. No defec
ts on color images.

CALF VESSELS: Normal compression, augmentation. No visualized echogenic material on gray scale. No de
fects on color images.

GSV and SSV: Normal compression, augmentation. No visualized echogenic material on gray scale. No def
ects on color images.

ANY DEEP VENOUS INSUFFICIENCY: Not evaluated.

ANY EVIDENCE OF POPLITEAL CYST: No.

OTHER: No other significant finding.

CONTRALATERAL COMMON FEMORAL VEIN AND SAPHENOFEMORAL JUNCTION:

Normal phasicity, compression and augmentation. No visualized echogenic material on gray scale. No de
fects on color images.



IMPRESSION:  NO EVIDENCE OF DVT OR SVT IN THE LEFT LEG.



COMMENT:  Findings were discussed with the ordering physician at 1614 hours on this date.



TECHNICAL DOCUMENTATION:  JOB ID:  [id number]

 2011 [employer]- All Rights Reserved



Reading location - IP/workstation name: [email or web address]

## 2018-03-21 NOTE — PDOC CONSULTATION
Consultation


Consult Date: 03/20/18


Attending physician:: JOSE ROWLEY





History of Present Illness


Admission Date/PCP: 


  03/20/18 17:07





  FELIPE JAMES MD





Patient complains of: Chest pain


History of Present Illness: 


PABLO ESPITIA JR is a 52 year old male resented to hospital with complaint of 

chest pain.  Patient states the chest pain began today.  Patient states he was 

short of breath and nauseated.  Patient states that the real reason why he came 

to the emergency room was because his left foot was hurting.  Patient reports 

that he has been evaluated for coronary artery disease invited where he 

underwent a cardiac cath that demonstrated essentially normal vessels.  ER has 

also documented that they called by the cardiology and cardiology stated that 

patient's chest pain is not cardiac.


Patient on my questioning described the chest pain is located in the left side 

lower chest area.  Best description he can give is more of aching.  Patient 

does describe prior history of myocardial infarction.  He denied any exertional 

component to the chest discomfort.  Patient was admitted for further evaluation 

and management.  I was consulted to evaluate his chest pain.








Past Medical History


Cardiac Medical History: Reports: Coronary Artery Disease, DVT - 2016, 

Myocardial Infarction, Hypertension


   Denies: Atrial Fibrillation, Congestive Heart Failure, Hyperlipidema, 

Pulmonary Embolism


Pulmonary Medical History: 


   Denies: Asthma, Chronic Obstructive Pulmonary Disease (COPD), Sleep Apnea


Neurological Medical History: 


   Denies: Seizures


Endocrine Medical History: 


   Denies: Diabetes Mellitus Type 1, Diabetes Mellitus Type 2, Hyperthyroidism, 

Hypothyroidism


GI Medical History: Reports: Gastroesophageal Reflux Disease


   Denies: Cirrhosis, Hepatitis


Musculoskeltal Medical History: Reports: Arthritis


Psychiatric Medical History: 


   Denies: Depression


Hematology: 


   Denies: Anemia





Past Surgical History


Past Surgical History: Reports: Appendectomy, Cardiac Catheterization, 

Orthopedic Surgery - R foot/knee, L hand, R arm, Tonsillectomy, Other - Removal 

of foreign body from my


   Denies: Pacemaker





Social History


Information Source: Patient


Smoking Status: Current Every Day Smoker


Frequency of Alcohol Use: Occasional


Hx Recreational Drug Use: No


Drugs: None


Hx Prescription Drug Abuse: No





- Advance Directive


Resuscitation Status: Full Code





Family History


Family History: Arthritis, CAD, COPD, CVA, Hyperlipidemia, Hypertension, 

Malignancy


Parental Family History Reviewed: Yes


Children Family History Reviewed: Yes


Sibling(s) Family History Reviewed.: Yes





Medication/Allergy


Home Medications: 








Aspirin [Aspirin EC] 81 mg PO DAILY 03/20/18 


Esomeprazole Magnesium [Nexium] 40 mg PO DAILY 03/20/18 


Atorvastatin Calcium [Lipitor 40 mg Tablet] 40 mg PO QHS #30 tablet 03/21/18 


Nicotine [Nicoderm 21 mg/24 Hr Transderm Patch] 1 each TD DAILY #30 patch.td24 

03/21/18 








Allergies/Adverse Reactions: 


 





No Known Allergies Allergy (Verified 03/20/18 14:33)


 











Review of Systems


Review of Systems: 





Please see history of present illness and past medical history as wall.


Constitutional: No fever or chills reported.


Head : No recent chronic headaches, recent head injury.


Eyes: No recent eye pain, diplopia, redness, discharge, acute visual changes.


Ears: No recent chronic ear pain, acute hearing loss, ear discharge.


Oral cavity: No recent  ulcerations, bleeding, oral cavity discomfort.


Neck: No recent acute neck pain reported.


Hematologic: No recent easy bruising or bleeding or hematologic malignancy 

reported.


Lymphatic: No recent lymphatic malignancy, chronic lymphadenopathy reported yet


Cardiovascular system review: See history of present illness.


Respiratory system review: No recent chronic cough, hemoptysis, blood clots in 

the lungs reported. Mild Shortness of breath on exertion


Gastrointestinal system review: Negative for any recent acute or chronic 

abdominal pain, hematemesis, melena, recent change in bowel habits.  


Genitourinary system review: No recent acute or chronic hematuria, flank pain, 

UTI etc. reported.


Skin system review: Negative for any recent abnormal bruising, no rash, no 

pruritus reported.


Neurologic: No prior history of strokes, mini strokes, seizure disorder.


Psychologic: No history of major psychosis or major depression reported.


Musculoskeletal: Minor aches and pains reported.  No acute joint swelling 

reported.


Endocrine: No recent polyuria, polydipsia, recent heat or cold intolerance.





Physical Exam


Vital Signs: 


 











Temp Pulse Resp BP Pulse Ox


 


 98.6 F   58 L  12   120/74   95 


 


 03/20/18 14:37  03/20/18 14:37  03/20/18 18:01  03/20/18 18:01  03/20/18 18:01














Results


Laboratory Results: 


 











  03/20/18





  18:20


 


Troponin I  0.030











EKG Comments: 





Shows sinus rhythm, no acute ST-T wave changes are noted.


Impressions: 


 





Foot X-Ray  03/20/18 00:00


IMPRESSION:  NEGATIVE STUDY OF THE LEFT FOOT. NO RADIOGRAPHIC EVIDENCE OF ACUTE 

INJURY.


 








Venous Doppler Study  03/20/18 15:00


IMPRESSION:  NO EVIDENCE OF DVT OR SVT IN THE LEFT LEG.


 














Assessment & Plan





- Diagnosis


(1) Chest pain


Qualifiers: 


   Chest pain type: unspecified   Qualified Code(s): R07.9 - Chest pain, 

unspecified   


Is this a current diagnosis for this admission?: Yes   





(2) CAD (coronary artery disease)


Qualifiers: 


   Coronary Disease-Associated Artery/Lesion type: unspecified vessel or lesion 

type   Native vs. transplanted heart: native heart   Associated angina: without 

angina   Qualified Code(s): I25.10 - Atherosclerotic heart disease of native 

coronary artery without angina pectoris   





(3) HTN (hypertension)


Qualifiers: 


   Hypertension type: essential hypertension   Qualified Code(s): I10 - 

Essential (primary) hypertension   


Is this a current diagnosis for this admission?: Yes   





(4) History of DVT (deep vein thrombosis)


Is this a current diagnosis for this admission?: Yes   





(5) Tobacco dependency


Is this a current diagnosis for this admission?: Yes   





- Notes


Notes: 





Chest pain: Patient describes prior history of myocardial infarction.  He also 

describes having had a heart catheterization about a year ago.  Feel that it is 

worthwhile to evaluate this further with a 2D echocardiogram and a nuclear 

stress test.  Will also recommend a CTA of the chest to look for any noncardiac 

chest pain.  Need to rule out pulmonary embolism in view of history of prior 

DVT.


Coronary artery disease: Patient describes history of prior myocardial 

infarction.  Recommend treatment with beta blockers statin ACE inhibitor/ARB.


Hypertension: Reasonably well controlled. Blood pressure goal in this patient 

is 135/85 or less.  This was discussed with the patient.  Currently blood 

pressure under reasonable control.  Better medication for this patient are ACE 

inhibitor/ARB/beta blocker etc. discussed side effects of uncontrolled 

hypertension and also severe hypotension.


Patient has history of chronic smoking.  Patient advised to quit smoking.


History of deep venous thrombosis: Venous duplex study was evaluated.  Patient 

to report any recurrence of leg discomfort etc.





- Time


Time Spent: 30 to 50 Minutes - CODE STATUS was discussed, patient remains full 

code.  Surrogate decision-maker unchanged.  Multiple medical problems were 

addressed.  More than 50% of the time spent coordinating care, discussing 

management plans with involved caregivers.  Management plans discussed with 

involved personnels.  Medical decision making was of moderate to high complexity

, patient's has multiple  comorbidities.


Medications reviewed and adjusted accordingly: Yes

## 2018-03-21 NOTE — XCELERA REPORT
[address] [address]

                             Tel: [phone]

                             Fax: [phone]



                    Transthoracic Echocardiogram Report

____________________________________________________________________________



Name: PABLO ESPITIA JR

MRN: [id number]                Age: 52 yrs

Gender: Male                   : 1965

Patient Status: Inpatient      Patient Location: [address] [place] [address]

Account #: [id number]

Study Date: 2018 11:14 AM

Accession #: [id number]

____________________________________________________________________________



Height: 71 in        Weight: 250 lb        BSA: 2.3 m2



____________________________________________________________________________

Procedure: A complete two-dimensional transthoracic echocardiogram was

performed (2D, M-mode, spectral and color flow Doppler). The study was

technically difficult with many images being suboptimal in quality.

Reason For Study: CP





Ordering Physician: KENNA HUI

Performed By: Sandi Hunt

____________________________________________________________________________





Interpretation Summary

The left ventricular ejection fraction is normal.

There is borderline concentric left ventricular hypertrophy.

Doppler measurements suggest impaired left ventricular relaxation, which is

associated with grade I/IV or mild diastolic dysfunction

The left ventricle is grossly normal size.

Wall motion cannot be accurately commented on, but no definite regional

wall motion abnormalities noted.

The right ventricular systolic function is normal.

The left atrium is mildly dilated.

The right atrium is normal in size

There is a trace amount of mitral regurgitation

There is no mitral valve stenosis.

No aortic regurgitation is present.

There is no aortic valve stenosis

No tricuspid regurgitation.

There is no tricuspid stenosis.

The aortic root is not well visualized but is probably normal size.

The inferior vena cava was not well visualized

There is no pericardial effusion.



____________________________________________________________________________



MMode/2D Measurements & Calculations

RVDd: 3.7 cm      LVIDd: 5.2 cm  FS: 36.3 %          Ao root diam: 4.1 cm

IVSd: 0.90 cm     LVIDs: 3.3 cm  EDV(Teich): 129.2 ml

                  LVPWd: 0.89 cm ESV(Teich): 44.4 ml Ao root area: 13.1 cm2

                                 EF(Teich): 65.6 %   LA dimension: 3.8 cm



        _____________________________________________________________

LVOT diam: 3.0 cm

LVOT area: 7.0 cm2





Doppler Measurements & Calculations

MV E max robin:      MV P1/2t max robin:    Ao V2 max:        LV V1 max P.8 cm/sec        52.8 cm/sec          101.8 cm/sec      3.9 mmHg

MV A max robin:      MV P1/2t: 66.4 msec  Ao max PG:        LV V1 max:

50.3 cm/sec        MVA(P1/2t): 3.3 cm2  4.1 mmHg          98.3 cm/sec

MV E/A: 1.0        MV dec slope:        REN(V,D): 6.8 cm2

                   232.7 cm/sec2



        _____________________________________________________________

PA V2 max:

80.9 cm/sec

PA max P.6 mmHg



____________________________________________________________________________

Left Ventricle

The left ventricle is grossly normal size. There is borderline concentric

left ventricular hypertrophy. The left ventricular ejection fraction is

normal. Doppler measurements suggest impaired left ventricular relaxation,

which is associated with grade I/IV or mild diastolic dysfunction. Wall

motion cannot be accurately commented on, but no definite regional wall

motion abnormalities noted.



Right Ventricle

The right ventricle is grossly normal size. There is normal right

ventricular wall thickness. The right ventricular systolic function is

normal.



Atria

The right atrium is normal in size. The left atrium is mildly dilated.

Interarterial septum not well visualized and not well dopplered. Cannot

comment on ASD/PFO presence.





Mitral Valve

The mitral valve is grossly normal. There is no mitral valve stenosis.

There is a trace amount of mitral regurgitation.



Aortic Valve

The aortic valve is grossly normal. There is no aortic valve stenosis. No

aortic regurgitation is present.



Tricuspid Valve

The tricuspid valve is not well visualized, but is grossly normal. There is

no tricuspid stenosis. No tricuspid regurgitation.



Pulmonic Valve

The pulmonic valve is not well visualized.



Great Vessels

The aortic root is not well visualized but is probably normal size. The

inferior vena cava was not well visualized.





Effusions

There is no pericardial effusion.



____________________________________________________________________________



Electronically signed by:      Kenna Hui      on 2018 01:29 PM



CC: KENNA HUI

>

Kenna Hui

## 2018-03-21 NOTE — PDOC DISCHARGE SUMMARY
General





- Admit/Disc Date/PCP


Admission Date/Primary Care Provider: 


  03/20/18 17:07





  FELIPE JAMES MD





Discharge Date: 03/21/18





- Discharge Diagnosis


(1) Chest pain


Is this a current diagnosis for this admission?: Yes   


Summary: 


Presented with chest pain, resolved at discharge


- nSTEMi based on elevated trops and no EKG changes


- Trops appropriately downtrended, likely due to Type 2 demand ischemia


- Patient previously had cardiac cath August 2017 which was noted to not have 

significant disease 


- Seen by cardiology this admission, who agreed with optomizing medical 

management


- Continue ASA 81mg daily


- Script given for Atorvastatin 40mg qhs 








(2) Tendinitis of ankle


Is this a current diagnosis for this admission?: Yes   


Summary: 


Acute onset, noted to have bump on top of foot. Denies history of gout or 

cellulitis. No recent trauma to foot. Left foot xray negative for acute 

pathology


- Foot feeling better today. Able to ambulate without issue. 





Outpatient plan


- Continue supportive care








(3) CAD (coronary artery disease)


Is this a current diagnosis for this admission?: No   


Summary: 


Per above








(4) Tobacco dependency


Is this a current diagnosis for this admission?: Yes   


Summary: 


Current smoker, script for nicotene patch given








- Additional Information


Resuscitation Status: Full Code


Discharge Diet: Cardiac


Discharge Activity: Activity As Tolerated


Prescriptions: 


Atorvastatin Calcium [Lipitor 40 mg Tablet] 40 mg PO QHS #30 tablet


Nicotine [Nicoderm 21 mg/24 Hr Transderm Patch] 1 each TD DAILY #30 patch.td24


Home Medications: 








Aspirin [Aspirin EC] 81 mg PO DAILY 03/20/18 


Esomeprazole Magnesium [Nexium] 40 mg PO DAILY 03/20/18 


Atorvastatin Calcium [Lipitor 40 mg Tablet] 40 mg PO QHS #30 tablet 03/21/18 


Nicotine [Nicoderm 21 mg/24 Hr Transderm Patch] 1 each TD DAILY #30 patch.td24 

03/21/18 











History of Present Illness


Patient complains of: chest pain and foot pain


History of Present Illness: 


PABLO ESPITIA JR is a 52 year old male who presented to hospital with 

complaint of chest pain.  Patient states the chest pain began today.  Patient 

states he was short of breath and nauseated.  Patient states that the real 

reason why he came to the emergency room was because his left foot was hurting.

  Patient reports that he has been evaluated for coronary artery disease 

invited where he underwent a cardiac cath that demonstrated essentially normal 

vessels.  ER has also documented that they called by the cardiology and 

cardiology stated that patient's chest pain is not cardiac. Chest pain is 

located in the left side lower chest area.  Best description he can give is 

more of aching.  Patient does describe prior history of myocardial infarction.  

He denied any exertional component to the chest discomfort.  








Physical Exam


Vital Signs: 


 











Temp Pulse Resp BP Pulse Ox


 


 98.0 F   51 L  12   103/65   95 


 


 03/21/18 12:01  03/21/18 12:01  03/21/18 12:01  03/21/18 07:35  03/21/18 12:01








 Intake & Output











 03/20/18 03/21/18 03/22/18





 06:59 06:59 06:59


 


Intake Total  542 


 


Balance  542 


 


Weight  113.8 kg 











General appearance: PRESENT: no acute distress, cooperative, obese


Head exam: PRESENT: atraumatic, normocephalic


Mouth exam: PRESENT: moist


Neck exam: PRESENT: full ROM


Respiratory exam: PRESENT: unlabored


Cardiovascular exam: PRESENT: RRR, +S1, +S2.  ABSENT: systolic murmur, 

tachycardia


GI/Abdominal exam: PRESENT: soft.  ABSENT: tenderness


Neurological exam: PRESENT: alert, awake, CN II-XII grossly intact


Psychiatric exam: PRESENT: appropriate affect, normal mood


Skin exam: PRESENT: dry





Results


Laboratory Results: 


 





 03/21/18 06:49 





 03/21/18 06:49 





 











  03/20/18 03/21/18 03/21/18





  18:20 06:49 06:49


 


WBC   10.7 H 


 


RBC   5.39 


 


Hgb   16.4 


 


Hct   49.0 


 


MCV   91 


 


MCH   30.4 


 


MCHC   33.5 


 


RDW   14.3 H 


 


Plt Count   267 


 


Seg Neutrophils %   62.0 


 


Lymphocytes %   25.0 


 


Monocytes %   9.0 


 


Eosinophils %   2.9 


 


Basophils %   1.1 


 


Absolute Neutrophils   6.7 


 


Absolute Lymphocytes   2.7 


 


Absolute Monocytes   1.0 


 


Absolute Eosinophils   0.3 


 


Absolute Basophils   0.1 


 


Sodium    140.5


 


Potassium    4.7


 


Chloride    107


 


Carbon Dioxide    23


 


Anion Gap    11


 


BUN    15


 


Creatinine    0.82


 


Est GFR ( Amer)    > 60


 


Est GFR (Non-Af Amer)    > 60


 


Glucose    95


 


Calcium    9.5


 


Total Bilirubin    0.4


 


AST    27


 


ALT    39


 


Alkaline Phosphatase    49


 


Total Protein    6.9


 


Albumin    3.9


 


Triglycerides  167 H   209 H


 


Cholesterol  188.67   172.46


 


LDL Cholesterol Direct  114 H   101 H


 


VLDL Cholesterol  33.4 H   41.8 H


 


HDL Cholesterol  53   40








 











  03/20/18 03/21/18 03/21/18





  18:20 00:25 06:49


 


Troponin I  0.030  0.016  < 0.012











Impressions: 


 





Foot X-Ray  03/20/18 00:00


IMPRESSION:  NEGATIVE STUDY OF THE LEFT FOOT. NO RADIOGRAPHIC EVIDENCE OF ACUTE 

INJURY.


 








Venous Doppler Study  03/20/18 15:00


IMPRESSION:  NO EVIDENCE OF DVT OR SVT IN THE LEFT LEG.


 














Qualifiers





- *


**PATEINT BEING DISCHARGED WITH ANY OF THE FOLLOWING DIAGNOSIS?: No

## 2018-09-18 NOTE — RADIOLOGY REPORT (SQ)
EXAM DESCRIPTION:  SACRUM AND COCCYX



COMPLETED DATE/TIME:  9/18/2018 11:48 am



REASON FOR STUDY:  SACRAL BACK PAIN



COMPARISON:  None.



NUMBER OF VIEWS:  Three views.



TECHNIQUE:  AP, lateral, and tilt views of the sacrum and coccyx.



LIMITATIONS:  Study is limited somewhat due to gas and fecal material in the rectosigmoid overlying t
he sacrum on the AP views.



FINDINGS:  MINERALIZATION: Normal.

BONES: No acute fracture or dislocation.  No worrisome bone lesions.

SOFT TISSUES: No soft tissue swelling.  No foreign body.

OTHER: No other significant finding.



IMPRESSION:  NEGATIVE STUDY OF THE SACRUM AND COCCYX.



TECHNICAL DOCUMENTATION:  JOB ID:  [id number]

 2011 [employer]- All Rights Reserved



Reading location - IP/workstation name: [email or web address]

## 2018-10-03 NOTE — RADIOLOGY REPORT (SQ)
EXAM DESCRIPTION:  LUMBAR SPINE COMPLETE



COMPLETED DATE/TIME:  10/3/2018 2:11 pm



REASON FOR STUDY:  ACUTE RIGHT-SIDED LBP WITHOUT SCIATICA



COMPARISON:  None.



NUMBER OF VIEWS:  Five views including obliques.



TECHNIQUE:  AP, lateral, oblique, and sacral radiographic images acquired of the lumbar spine.



LIMITATIONS:  None.



FINDINGS:  MINERALIZATION: Normal.

SEGMENTATION: Normal.  No transitional anatomy.

ALIGNMENT: Normal.

VERTEBRAE: Maintained height.  No fracture or worrisome bone lesion.

DISCS: Slight narrowing at L1-2 and L2-3.  Small marginal osteophytes at several levels.

POSTERIOR ELEMENTS: Hypertrophic facet changes at L5-S1.

HARDWARE: None in the spine.

PARASPINAL SOFT TISSUES: Normal.

PELVIS: Intact as visualized. No fractures or worrisome bone lesions. SI joints intact.

OTHER: No other significant finding.



IMPRESSION:  Mild degenerative disc changes, spondylosis, and facet arthropathy.



TECHNICAL DOCUMENTATION:  JOB ID:  [id number]

 2011 [employer]- All Rights Reserved



Reading location - IP/workstation name: [email or web address]